# Patient Record
Sex: FEMALE | Race: WHITE | NOT HISPANIC OR LATINO
[De-identification: names, ages, dates, MRNs, and addresses within clinical notes are randomized per-mention and may not be internally consistent; named-entity substitution may affect disease eponyms.]

---

## 2023-06-14 PROBLEM — Z00.00 ENCOUNTER FOR PREVENTIVE HEALTH EXAMINATION: Status: ACTIVE | Noted: 2023-06-14

## 2023-06-19 ENCOUNTER — APPOINTMENT (OUTPATIENT)
Dept: CARDIOLOGY | Facility: CLINIC | Age: 53
End: 2023-06-19
Payer: MEDICAID

## 2023-06-19 ENCOUNTER — NON-APPOINTMENT (OUTPATIENT)
Age: 53
End: 2023-06-19

## 2023-06-19 VITALS
WEIGHT: 275 LBS | DIASTOLIC BLOOD PRESSURE: 100 MMHG | RESPIRATION RATE: 16 BRPM | BODY MASS INDEX: 45.82 KG/M2 | HEIGHT: 65 IN | SYSTOLIC BLOOD PRESSURE: 130 MMHG | OXYGEN SATURATION: 96 % | HEART RATE: 76 BPM

## 2023-06-19 DIAGNOSIS — Z83.3 FAMILY HISTORY OF DIABETES MELLITUS: ICD-10-CM

## 2023-06-19 DIAGNOSIS — Z82.49 FAMILY HISTORY OF ISCHEMIC HEART DISEASE AND OTHER DISEASES OF THE CIRCULATORY SYSTEM: ICD-10-CM

## 2023-06-19 DIAGNOSIS — Z80.1 FAMILY HISTORY OF MALIGNANT NEOPLASM OF TRACHEA, BRONCHUS AND LUNG: ICD-10-CM

## 2023-06-19 PROCEDURE — 99204 OFFICE O/P NEW MOD 45 MIN: CPT | Mod: 25

## 2023-06-19 PROCEDURE — 93000 ELECTROCARDIOGRAM COMPLETE: CPT

## 2023-06-19 NOTE — HISTORY OF PRESENT ILLNESS
[FreeTextEntry1] : Patient is a 51yo F with HLD, tobacco dependence, morbid obesity here for cardiac evaluation. Has not seen MD in 30years, but due to both parents being ill and passed this year she has decided to come in. REferred due to abnormal ECG.  OCcasionally gets feeling of near syncope but doesn’t pass out. Feels dizzy when this happen. NO clear triggers.  Denies dyspnea or CP. Struggles with some chronic LBP. Patient denies PND/orthopnea/edema/palpitations/syncope/claudication. \par \par Primary started her on lexapro for anxiety but stopped due to side effects and feels better off it. \par \par Lives with her sister. Son is in his 30s. Not working but training to go back to work. 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 \par \par \par ROS: GI negative, all others negative

## 2023-06-19 NOTE — ASSESSMENT
[FreeTextEntry1] : ECG: SR, septal Qs, PRWP, Consider LAFB,low voltage\par \par \par  HDL 47   (5/2023) \par CMP/CBC/TSH  unremarkable 5/2023

## 2023-06-19 NOTE — DISCUSSION/SUMMARY
[EKG obtained to assist in diagnosis and management of assessed problem(s)] : EKG obtained to assist in diagnosis and management of assessed problem(s) [FreeTextEntry1] : Patient is a 53yo F with HLD, tobacco dependence, morbid obesity here for cardiac evaluation. \par -BP high but very anxious, was apparently normal at PMD\par -ECG abnormal, borderline LAFB and septal Qs. Low voltage and PRWP are likely from body habitus\par -Family history of CHF but no known premature CAD \par -Needs further cardiac work up due to ECG/symptoms and risk factors\par \par 1. Echo/EST to evaluate abnormal ECG and dizziness\par 2. Carotid to evaluate dizzy spells\par 3. Plan to try diet/lifestyle modifications prior to meds for HLD\par 4. Re-eval BP during stress and follow up\par 5. CT CAC score for further risk stratification\par 6. Recommend aggressive diet and lifestyle modifications , seems motivated\par 7. Follow up after testing

## 2023-07-31 ENCOUNTER — APPOINTMENT (OUTPATIENT)
Dept: CARDIOLOGY | Facility: CLINIC | Age: 53
End: 2023-07-31
Payer: MEDICAID

## 2023-07-31 PROCEDURE — 93306 TTE W/DOPPLER COMPLETE: CPT

## 2023-08-07 ENCOUNTER — APPOINTMENT (OUTPATIENT)
Dept: CARDIOLOGY | Facility: CLINIC | Age: 53
End: 2023-08-07
Payer: MEDICAID

## 2023-08-07 DIAGNOSIS — R94.39 ABNORMAL RESULT OF OTHER CARDIOVASCULAR FUNCTION STUDY: ICD-10-CM

## 2023-08-07 PROCEDURE — 93015 CV STRESS TEST SUPVJ I&R: CPT

## 2023-08-21 ENCOUNTER — APPOINTMENT (OUTPATIENT)
Dept: CARDIOLOGY | Facility: CLINIC | Age: 53
End: 2023-08-21
Payer: MEDICAID

## 2023-08-21 PROCEDURE — A9500: CPT

## 2023-08-21 PROCEDURE — 78452 HT MUSCLE IMAGE SPECT MULT: CPT

## 2023-08-21 PROCEDURE — 93015 CV STRESS TEST SUPVJ I&R: CPT

## 2023-08-21 RX ADMIN — REGADENOSON 0.04 MG/5ML: 0.08 INJECTION, SOLUTION INTRAVENOUS at 00:00

## 2023-08-23 ENCOUNTER — APPOINTMENT (OUTPATIENT)
Dept: CARDIOLOGY | Facility: CLINIC | Age: 53
End: 2023-08-23

## 2023-08-26 RX ORDER — REGADENOSON 0.08 MG/ML
0.4 INJECTION, SOLUTION INTRAVENOUS
Qty: 4 | Refills: 0 | Status: COMPLETED | OUTPATIENT
Start: 2023-08-21

## 2023-08-28 ENCOUNTER — APPOINTMENT (OUTPATIENT)
Dept: CARDIOLOGY | Facility: CLINIC | Age: 53
End: 2023-08-28
Payer: MEDICAID

## 2023-08-28 VITALS
WEIGHT: 274 LBS | RESPIRATION RATE: 16 BRPM | HEIGHT: 65 IN | DIASTOLIC BLOOD PRESSURE: 87 MMHG | OXYGEN SATURATION: 99 % | BODY MASS INDEX: 45.65 KG/M2 | HEART RATE: 90 BPM | SYSTOLIC BLOOD PRESSURE: 140 MMHG

## 2023-08-28 DIAGNOSIS — R94.31 ABNORMAL ELECTROCARDIOGRAM [ECG] [EKG]: ICD-10-CM

## 2023-08-28 DIAGNOSIS — E66.01 MORBID (SEVERE) OBESITY DUE TO EXCESS CALORIES: ICD-10-CM

## 2023-08-28 DIAGNOSIS — R42 DIZZINESS AND GIDDINESS: ICD-10-CM

## 2023-08-28 DIAGNOSIS — F17.210 NICOTINE DEPENDENCE, CIGARETTES, UNCOMPLICATED: ICD-10-CM

## 2023-08-28 DIAGNOSIS — R03.0 ELEVATED BLOOD-PRESSURE READING, W/OUT DIAGNOSIS OF HYPERTENSION: ICD-10-CM

## 2023-08-28 DIAGNOSIS — E78.5 HYPERLIPIDEMIA, UNSPECIFIED: ICD-10-CM

## 2023-08-28 PROCEDURE — 99214 OFFICE O/P EST MOD 30 MIN: CPT

## 2023-08-29 ENCOUNTER — APPOINTMENT (OUTPATIENT)
Dept: CARDIOLOGY | Facility: CLINIC | Age: 53
End: 2023-08-29

## 2023-12-29 ENCOUNTER — APPOINTMENT (OUTPATIENT)
Dept: CARDIOLOGY | Facility: CLINIC | Age: 53
End: 2023-12-29
Payer: MEDICAID

## 2023-12-29 PROCEDURE — 93880 EXTRACRANIAL BILAT STUDY: CPT

## 2024-01-03 RX ORDER — KIT FOR THE PREPARATION OF TECHNETIUM TC99M SESTAMIBI 1 MG/5ML
INJECTION, POWDER, LYOPHILIZED, FOR SOLUTION PARENTERAL
Refills: 0 | Status: COMPLETED | OUTPATIENT
Start: 2024-01-03

## 2024-01-03 RX ADMIN — KIT FOR THE PREPARATION OF TECHNETIUM TC99M SESTAMIBI 0: 1 INJECTION, POWDER, LYOPHILIZED, FOR SOLUTION PARENTERAL at 00:00

## 2024-01-08 ENCOUNTER — APPOINTMENT (OUTPATIENT)
Dept: CARDIOLOGY | Facility: CLINIC | Age: 54
End: 2024-01-08

## 2024-01-08 NOTE — HISTORY OF PRESENT ILLNESS
[FreeTextEntry1] : Patient is a 51yo F with HLD, tobacco dependence, morbid obesity here for cardiac evaluation. Has not seen MD in 30years, but due to both parents being ill and passed this year she has decided to come in. REferred due to abnormal ECG.  OCcasionally gets feeling of near syncope but doesn't pass out. Feels dizzy when this happen. NO clear triggers.  Denies dyspnea or CP. Struggles with some chronic LBP. Patient denies PND/orthopnea/edema/palpitations/syncope/claudication. Patient underwent cardiac testing after last visit. Carotid and calcium score not done yet   Primary started her on lexapro for anxiety but stopped due to side effects and feels better off it. No new symptoms since last OV. HAs made some dietary changes with cutting back butter.   Lives with her sister. Son is in his 30s. Not working but training to go back to work. 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    ROS: GI and  negative

## 2024-01-08 NOTE — DISCUSSION/SUMMARY
[FreeTextEntry1] : Patient is a 51yo F with HLD, tobacco dependence, morbid obesity here for cardiac follow up  -BP high again, remains anxious -ECG abnormal, borderline LAFB and septal Qs. Low voltage and PRWP are likely from body habitus -Family history of CHF but no known premature CAD  -Pharm nuclear stress negative after submax EST (done 8/2023) -Echo with normal BiV function, mild MR/TR not clinically significant at this time, surveillance only.  -CV stable at this time, needs aggressive risk factor modification  1. CV stable, continue aggressive risk factor modification  2. Plan to try diet/lifestyle modifications prior to meds for HLD and BP 3. CAC score and carotid still pending for further risk stratification, call with results 4. Recommend aggressive diet and lifestyle modifications , seems motivated 5. Follow up 4-5 months

## 2024-01-08 NOTE — ASSESSMENT
[FreeTextEntry1] : ECG: SR, septal Qs, PRWP, Consider LAFB,low voltage (prior OV)     HDL 47   (5/2023)  CMP/CBC/TSH  unremarkable 5/2023  ECHO 7/2023: 1. Normal LV size, systolic and diastolic function. EF 60-65% 2. Normal RV/LA/RA  3. mild MR/TR 4. Trivial pericardial effusion   PHARM NUCLEAR STRESS 8/2023: 1. Negative for ischemia/infarct, EF 66% 2. Normal BP response (baseline 134/86)   EST 8/2023: Submax, achieved 3 minutes and 4 METS, baseline BP was 144/86, peak 210/98